# Patient Record
Sex: FEMALE | Race: WHITE | Employment: OTHER | URBAN - METROPOLITAN AREA
[De-identification: names, ages, dates, MRNs, and addresses within clinical notes are randomized per-mention and may not be internally consistent; named-entity substitution may affect disease eponyms.]

---

## 2017-12-19 PROBLEM — J45.40 MODERATE PERSISTENT ASTHMA WITHOUT COMPLICATION: Status: ACTIVE | Noted: 2017-12-19

## 2017-12-19 PROBLEM — E78.2 MIXED HYPERLIPIDEMIA: Status: ACTIVE | Noted: 2017-12-19

## 2017-12-19 PROBLEM — I10 HTN (HYPERTENSION), BENIGN: Status: ACTIVE | Noted: 2017-12-19

## 2017-12-19 PROBLEM — J45.40 MODERATE PERSISTENT ASTHMA WITHOUT COMPLICATION (HCC): Status: ACTIVE | Noted: 2017-12-19

## 2019-12-20 PROBLEM — I25.10 CORONARY ARTERY DISEASE INVOLVING NATIVE CORONARY ARTERY OF NATIVE HEART WITHOUT ANGINA PECTORIS: Status: ACTIVE | Noted: 2019-12-20

## 2020-01-18 PROBLEM — D32.9 MENINGIOMA (HCC): Status: ACTIVE | Noted: 2020-01-18

## 2022-12-30 ENCOUNTER — APPOINTMENT (OUTPATIENT)
Dept: GENERAL RADIOLOGY | Age: 77
End: 2022-12-30
Attending: EMERGENCY MEDICINE
Payer: MEDICARE

## 2022-12-30 ENCOUNTER — HOSPITAL ENCOUNTER (EMERGENCY)
Age: 77
Discharge: HOME | End: 2022-12-30
Attending: EMERGENCY MEDICINE
Payer: MEDICARE

## 2022-12-30 ENCOUNTER — APPOINTMENT (OUTPATIENT)
Dept: ULTRASOUND IMAGING | Age: 77
End: 2022-12-30
Attending: EMERGENCY MEDICINE
Payer: MEDICARE

## 2022-12-30 VITALS
DIASTOLIC BLOOD PRESSURE: 75 MMHG | TEMPERATURE: 98 F | HEART RATE: 79 BPM | OXYGEN SATURATION: 97 % | HEIGHT: 64 IN | WEIGHT: 116 LBS | BODY MASS INDEX: 19.81 KG/M2 | RESPIRATION RATE: 16 BRPM | SYSTOLIC BLOOD PRESSURE: 140 MMHG

## 2022-12-30 DIAGNOSIS — M54.31 SCIATICA OF RIGHT SIDE: Primary | ICD-10-CM

## 2022-12-30 PROCEDURE — 73502 X-RAY EXAM HIP UNI 2-3 VIEWS: CPT | Performed by: EMERGENCY MEDICINE

## 2022-12-30 PROCEDURE — 99284 EMERGENCY DEPT VISIT MOD MDM: CPT

## 2022-12-30 PROCEDURE — 93971 EXTREMITY STUDY: CPT | Performed by: EMERGENCY MEDICINE

## 2022-12-30 RX ORDER — FAMOTIDINE 20 MG/1
20 TABLET, FILM COATED ORAL 2 TIMES DAILY
COMMUNITY

## 2022-12-30 RX ORDER — METHYLPREDNISOLONE 4 MG/1
TABLET ORAL
Qty: 1 EACH | Refills: 0 | Status: SHIPPED | OUTPATIENT
Start: 2022-12-30

## 2022-12-30 NOTE — ED INITIAL ASSESSMENT (HPI)
To er with c/o pain in right hip pain that radiates into buttocks and into right leg for past several months. States the pain brought her in today.

## 2024-08-25 ENCOUNTER — HOSPITAL ENCOUNTER (EMERGENCY)
Age: 79
Discharge: HOME | End: 2024-08-25
Payer: MEDICARE

## 2024-08-25 VITALS
OXYGEN SATURATION: 99 % | TEMPERATURE: 98 F | HEIGHT: 64 IN | BODY MASS INDEX: 18.1 KG/M2 | RESPIRATION RATE: 16 BRPM | WEIGHT: 106 LBS | HEART RATE: 75 BPM | SYSTOLIC BLOOD PRESSURE: 156 MMHG | DIASTOLIC BLOOD PRESSURE: 72 MMHG

## 2024-08-25 DIAGNOSIS — L03.115 CELLULITIS OF RIGHT LOWER EXTREMITY: ICD-10-CM

## 2024-08-25 DIAGNOSIS — S81.801A OPEN WOUND OF RIGHT LOWER LEG, INITIAL ENCOUNTER: Primary | ICD-10-CM

## 2024-08-25 PROCEDURE — 99283 EMERGENCY DEPT VISIT LOW MDM: CPT

## 2024-08-25 PROCEDURE — 99284 EMERGENCY DEPT VISIT MOD MDM: CPT

## 2024-08-25 RX ORDER — LEVOFLOXACIN 750 MG/1
750 TABLET, FILM COATED ORAL DAILY
Qty: 10 TABLET | Refills: 0 | Status: SHIPPED | OUTPATIENT
Start: 2024-08-25 | End: 2024-09-01

## 2024-08-25 RX ORDER — AZELASTINE HYDROCHLORIDE, FLUTICASONE PROPIONATE 137; 50 UG/1; UG/1
SPRAY, METERED NASAL
COMMUNITY

## 2024-08-25 RX ORDER — FLUTICASONE PROPIONATE AND SALMETEROL 500; 50 UG/1; UG/1
1 POWDER RESPIRATORY (INHALATION) 2 TIMES DAILY
COMMUNITY

## 2024-08-25 NOTE — ED INITIAL ASSESSMENT (HPI)
Lac to right leg on Friday after the vacuum fell on it.  Today with pain to right lower leg and generalized weakness.  Last td ukn.

## 2024-08-25 NOTE — ED PROVIDER NOTES
Patient Seen in: [facility] In [place]      History     Chief Complaint   Patient presents with    Laceration     Stated Complaint: right leg lac on friday    Subjective:   The history is provided by the patient.       Patient is a 79-year-old female here for evaluation of right leg laceration.  Injury sustained on Friday, 2 days ago.  She states the vacuum  fell on her right leg, resulting in skin tear.  Today while at [employer], she states she felt unwell.  There is been mild surrounding increased redness around open wound.  Denies fevers, chills or sweats.  Denies URI symptoms.  Patient is not diabetic.  Denies history of MRSA    Tdap 2023    Objective:   Past Medical History:    Arthritis    Asthma (HCC)    Atherosclerosis of coronary artery    Brain tumor (HCC)    High cholesterol    Hypertension    Migraine              Past Surgical History:   Procedure Laterality Date    Brain surgery      Cardiac cath - cardio (external)  09/24/2012    normal pulmonary arterial pressure, mild elevation of pulmonary vascular system with systemic vascular resistance, low cardiac output, nonobstructive CAD     Eye surgery      5 surgeries    Hysterectomy                  Social History     Socioeconomic History    Marital status:    Tobacco Use    Smoking status: Never    Smokeless tobacco: Never   Vaping Use    Vaping status: Never Used   Substance and Sexual Activity    Alcohol use: Yes     Comment: occassional wine    Drug use: No     Social Determinants of Health     Food Insecurity: Low Risk  (10/24/2023)    Received from [facility]    Food Insecurity     Have there been times that your food ran out, and you didn't have money to get more?: No     Are there times that you worry that this might happen?: No   Transportation Needs: Low Risk  (10/24/2023)    Received from [facility]    Transportation Needs     Do you have trouble getting  transportation to medical appointments?: No    Received from [facility]    Social Connections              Review of Systems    Positive for stated Chief Complaint: Laceration    Other systems are as noted in HPI.  Constitutional and vital signs reviewed.      All other systems reviewed and negative except as noted above.    Physical Exam     ED Triage Vitals [08/25/24 1031]   /72   Pulse 75   Resp 16   Temp 97.8 °F (36.6 °C)   Temp src Temporal   SpO2 99 %   O2 Device None (Room air)       Current Vitals:   Vital Signs  BP: 156/72  Pulse: 75  Resp: 16  Temp: 97.8 °F (36.6 °C)  Temp src: Temporal    Oxygen Therapy  SpO2: 99 %  O2 Device: None (Room air)            Physical Exam  Vitals and nursing note reviewed.   Constitutional:       Appearance: Normal appearance.   HENT:      Mouth/Throat:      Mouth: Mucous membranes are moist.   Eyes:      Conjunctiva/sclera: Conjunctivae normal.      Pupils: Pupils are equal, round, and reactive to light.   Cardiovascular:      Rate and Rhythm: Normal rate.      Pulses: Normal pulses.   Pulmonary:      Effort: Pulmonary effort is normal.   Skin:            Comments: Skin tear laceration on tibial aspect of right lower extremity, measuring about 5 cm in length by 2 cm in width.  There is moderate surrounding erythema, edema and tenderness with palpation. Neurovascular status intact   Neurological:      Mental Status: She is alert.             ED Course   Labs Reviewed - No data to display               MDM                  Medical Decision Making  Differentials include but are not limited to skin tear, inflammatory response, secondary infection, cellulitis, osteomyelitis and less likely fracture.  Declines imaging here today.  Mild surrounding erythema noted, patient is well-appearing, afebrile and hemodynamically stable.  Patient states she can only take 2 antibiotics that being azithromycin and Levaquin, despite Levaquin being listed as an allergy.   Patient is adamant that she has had this most recently for COVID-pneumonia and has tolerated Levaquin in the past.  Will start Levaquin daily for cellulitis.  Strict return precautions reviewed with patient who agrees with plan of care.  All questions answered to patient's satisfaction.        Disposition and Plan     Clinical Impression:  1. Open wound of right lower leg, initial encounter    2. Cellulitis of right lower extremity         Disposition:  Discharge  8/25/2024 10:46 am    Follow-up:  Jose Torres DO  [phone]    Follow up in 3 day(s)            Medications Prescribed:  Discharge Medication List as of 8/25/2024 10:50 AM        START taking these medications    Details   levoFLOXacin 750 MG Oral Tab Take 1 tablet (750 mg total) by mouth daily for 7 days., Normal, Disp-10 tablet, R-0